# Patient Record
Sex: FEMALE | Race: WHITE | NOT HISPANIC OR LATINO | Employment: FULL TIME | URBAN - METROPOLITAN AREA
[De-identification: names, ages, dates, MRNs, and addresses within clinical notes are randomized per-mention and may not be internally consistent; named-entity substitution may affect disease eponyms.]

---

## 2018-07-11 ENCOUNTER — TRANSCRIBE ORDERS (OUTPATIENT)
Dept: LAB | Facility: HOSPITAL | Age: 48
End: 2018-07-11

## 2018-07-11 ENCOUNTER — LAB (OUTPATIENT)
Dept: LAB | Facility: HOSPITAL | Age: 48
End: 2018-07-11
Attending: ORTHOPAEDIC SURGERY

## 2018-07-11 DIAGNOSIS — M25.562 LEFT KNEE PAIN, UNSPECIFIED CHRONICITY: ICD-10-CM

## 2018-07-11 DIAGNOSIS — M25.562 LEFT KNEE PAIN, UNSPECIFIED CHRONICITY: Primary | ICD-10-CM

## 2018-07-11 LAB
ALBUMIN SERPL-MCNC: 4.5 G/DL (ref 3.5–5.2)
ALBUMIN/GLOB SERPL: 1.7 G/DL
ALP SERPL-CCNC: 36 U/L (ref 39–117)
ALT SERPL W P-5'-P-CCNC: 14 U/L (ref 1–33)
ANION GAP SERPL CALCULATED.3IONS-SCNC: 13.8 MMOL/L
AST SERPL-CCNC: 15 U/L (ref 1–32)
BASOPHILS # BLD AUTO: 0.02 10*3/MM3 (ref 0–0.2)
BASOPHILS NFR BLD AUTO: 0.4 % (ref 0–1.5)
BILIRUB SERPL-MCNC: 0.5 MG/DL (ref 0.1–1.2)
BUN BLD-MCNC: 18 MG/DL (ref 6–20)
BUN/CREAT SERPL: 16.1 (ref 7–25)
CALCIUM SPEC-SCNC: 9.7 MG/DL (ref 8.6–10.5)
CHLORIDE SERPL-SCNC: 101 MMOL/L (ref 98–107)
CO2 SERPL-SCNC: 24.2 MMOL/L (ref 22–29)
CREAT BLD-MCNC: 1.12 MG/DL (ref 0.57–1)
DEPRECATED RDW RBC AUTO: 45.1 FL (ref 37–54)
EOSINOPHIL # BLD AUTO: 0.05 10*3/MM3 (ref 0–0.7)
EOSINOPHIL NFR BLD AUTO: 0.9 % (ref 0.3–6.2)
ERYTHROCYTE [DISTWIDTH] IN BLOOD BY AUTOMATED COUNT: 12.8 % (ref 11.7–13)
GFR SERPL CREATININE-BSD FRML MDRD: 52 ML/MIN/1.73
GLOBULIN UR ELPH-MCNC: 2.6 GM/DL
GLUCOSE BLD-MCNC: 85 MG/DL (ref 65–99)
HCT VFR BLD AUTO: 37.4 % (ref 35.6–45.5)
HGB BLD-MCNC: 12.6 G/DL (ref 11.9–15.5)
IMM GRANULOCYTES # BLD: 0.01 10*3/MM3 (ref 0–0.03)
IMM GRANULOCYTES NFR BLD: 0.2 % (ref 0–0.5)
LYMPHOCYTES # BLD AUTO: 1.96 10*3/MM3 (ref 0.9–4.8)
LYMPHOCYTES NFR BLD AUTO: 35.8 % (ref 19.6–45.3)
MCH RBC QN AUTO: 32.6 PG (ref 26.9–32)
MCHC RBC AUTO-ENTMCNC: 33.7 G/DL (ref 32.4–36.3)
MCV RBC AUTO: 96.9 FL (ref 80.5–98.2)
MONOCYTES # BLD AUTO: 0.57 10*3/MM3 (ref 0.2–1.2)
MONOCYTES NFR BLD AUTO: 10.4 % (ref 5–12)
NEUTROPHILS # BLD AUTO: 2.87 10*3/MM3 (ref 1.9–8.1)
NEUTROPHILS NFR BLD AUTO: 52.5 % (ref 42.7–76)
PLATELET # BLD AUTO: 195 10*3/MM3 (ref 140–500)
PMV BLD AUTO: 11.8 FL (ref 6–12)
POTASSIUM BLD-SCNC: 4.3 MMOL/L (ref 3.5–5.2)
PROT SERPL-MCNC: 7.1 G/DL (ref 6–8.5)
RBC # BLD AUTO: 3.86 10*6/MM3 (ref 3.9–5.2)
SODIUM BLD-SCNC: 139 MMOL/L (ref 136–145)
WBC NRBC COR # BLD: 5.47 10*3/MM3 (ref 4.5–10.7)

## 2018-07-11 PROCEDURE — 80053 COMPREHEN METABOLIC PANEL: CPT

## 2018-07-11 PROCEDURE — 36415 COLL VENOUS BLD VENIPUNCTURE: CPT

## 2018-07-11 PROCEDURE — 85025 COMPLETE CBC W/AUTO DIFF WBC: CPT

## 2018-12-06 ENCOUNTER — APPOINTMENT (OUTPATIENT)
Dept: PREADMISSION TESTING | Facility: HOSPITAL | Age: 48
End: 2018-12-06

## 2018-12-06 VITALS
SYSTOLIC BLOOD PRESSURE: 104 MMHG | HEART RATE: 62 BPM | OXYGEN SATURATION: 99 % | HEIGHT: 65 IN | WEIGHT: 150.7 LBS | BODY MASS INDEX: 25.11 KG/M2 | TEMPERATURE: 97.8 F | DIASTOLIC BLOOD PRESSURE: 71 MMHG | RESPIRATION RATE: 20 BRPM

## 2018-12-06 LAB
ALBUMIN SERPL-MCNC: 4.3 G/DL (ref 3.5–5.2)
ALBUMIN/GLOB SERPL: 1.5 G/DL
ALP SERPL-CCNC: 41 U/L (ref 39–117)
ALT SERPL W P-5'-P-CCNC: 18 U/L (ref 1–33)
ANION GAP SERPL CALCULATED.3IONS-SCNC: 10.6 MMOL/L
AST SERPL-CCNC: 26 U/L (ref 1–32)
BASOPHILS # BLD AUTO: 0.04 10*3/MM3 (ref 0–0.2)
BASOPHILS NFR BLD AUTO: 0.8 % (ref 0–1.5)
BILIRUB SERPL-MCNC: 0.4 MG/DL (ref 0.1–1.2)
BUN BLD-MCNC: 9 MG/DL (ref 6–20)
BUN/CREAT SERPL: 10.8 (ref 7–25)
CALCIUM SPEC-SCNC: 9.5 MG/DL (ref 8.6–10.5)
CHLORIDE SERPL-SCNC: 100 MMOL/L (ref 98–107)
CO2 SERPL-SCNC: 28.4 MMOL/L (ref 22–29)
CREAT BLD-MCNC: 0.83 MG/DL (ref 0.57–1)
DEPRECATED RDW RBC AUTO: 45.9 FL (ref 37–54)
EOSINOPHIL # BLD AUTO: 0.05 10*3/MM3 (ref 0–0.7)
EOSINOPHIL NFR BLD AUTO: 1 % (ref 0.3–6.2)
ERYTHROCYTE [DISTWIDTH] IN BLOOD BY AUTOMATED COUNT: 12.5 % (ref 11.7–13)
GFR SERPL CREATININE-BSD FRML MDRD: 73 ML/MIN/1.73
GLOBULIN UR ELPH-MCNC: 2.9 GM/DL
GLUCOSE BLD-MCNC: 75 MG/DL (ref 65–99)
HCT VFR BLD AUTO: 39.4 % (ref 35.6–45.5)
HGB BLD-MCNC: 12.7 G/DL (ref 11.9–15.5)
IMM GRANULOCYTES # BLD: 0 10*3/MM3 (ref 0–0.03)
IMM GRANULOCYTES NFR BLD: 0 % (ref 0–0.5)
LYMPHOCYTES # BLD AUTO: 1.84 10*3/MM3 (ref 0.9–4.8)
LYMPHOCYTES NFR BLD AUTO: 36.4 % (ref 19.6–45.3)
MCH RBC QN AUTO: 32.3 PG (ref 26.9–32)
MCHC RBC AUTO-ENTMCNC: 32.2 G/DL (ref 32.4–36.3)
MCV RBC AUTO: 100.3 FL (ref 80.5–98.2)
MONOCYTES # BLD AUTO: 0.5 10*3/MM3 (ref 0.2–1.2)
MONOCYTES NFR BLD AUTO: 9.9 % (ref 5–12)
NEUTROPHILS # BLD AUTO: 2.63 10*3/MM3 (ref 1.9–8.1)
NEUTROPHILS NFR BLD AUTO: 51.9 % (ref 42.7–76)
PLATELET # BLD AUTO: 239 10*3/MM3 (ref 140–500)
PMV BLD AUTO: 10.7 FL (ref 6–12)
POTASSIUM BLD-SCNC: 3.9 MMOL/L (ref 3.5–5.2)
PROT SERPL-MCNC: 7.2 G/DL (ref 6–8.5)
RBC # BLD AUTO: 3.93 10*6/MM3 (ref 3.9–5.2)
SODIUM BLD-SCNC: 139 MMOL/L (ref 136–145)
WBC NRBC COR # BLD: 5.06 10*3/MM3 (ref 4.5–10.7)

## 2018-12-06 PROCEDURE — 80053 COMPREHEN METABOLIC PANEL: CPT | Performed by: ORTHOPAEDIC SURGERY

## 2018-12-06 PROCEDURE — 36415 COLL VENOUS BLD VENIPUNCTURE: CPT

## 2018-12-06 PROCEDURE — 85025 COMPLETE CBC W/AUTO DIFF WBC: CPT | Performed by: ORTHOPAEDIC SURGERY

## 2018-12-06 NOTE — DISCHARGE INSTRUCTIONS
Take the following medications the morning of surgery with a small sip of water:  NONE    HOSPITAL TO CALL DAY BEFORE SURGERY WITH ARRIVAL TIME       General Instructions:  • Do not eat solid food after midnight the night before surgery.  • You may drink clear liquids day of surgery but must stop at least one hour before your hospital arrival time   • It is beneficial for you to have a clear drink that contains carbohydrates the day of surgery.  We suggest a 12 to 20 ounce bottle of Gatorade or Powerade for non-diabetic patients or a 12 to 20 ounce bottle of G2 or Powerade Zero for diabetic patients. (Pediatric patients, are not advised to drink a 12 to 20 ounce carbohydrate drink)    Clear liquids are liquids you can see through.  Nothing red in color.     Plain water                               Sports drinks  Sodas                                   Gelatin (Jell-O)  Fruit juices without pulp such as white grape juice and apple juice  Popsicles that contain no fruit or yogurt  Tea or coffee (no cream or milk added)  Gatorade / Powerade  G2 / Powerade Zero    • Infants may have breast milk up to four hours before surgery.  • Infants drinking formula may drink formula up to six hours before surgery.   • Patients who avoid smoking, chewing tobacco and alcohol for 4 weeks prior to surgery have a reduced risk of post-operative complications.  Quit smoking as many days before surgery as you can.  • Do not smoke, use chewing tobacco or drink alcohol the day of surgery.   • If applicable bring your C-PAP/ BI-PAP machine.  • Bring any papers given to you in the doctor’s office.  • Wear clean comfortable clothes and socks.  • Do not wear contact lenses or make-up.  Bring a case for your glasses.   • Bring crutches or walker if applicable.  • Remove all piercings.  Leave jewelry and any other valuables at home.  • Hair extensions with metal clips must be removed prior to surgery.  • The Pre-Admission Testing nurse will  instruct you to bring medications if unable to obtain an accurate list in Pre-Admission Testing.            Preventing a Surgical Site Infection:  • For 2 to 3 days before surgery, avoid shaving with a razor because the razor can irritate skin and make it easier to develop an infection.    • Any areas of open skin can increase the risk of a post-operative wound infection by allowing bacteria to enter and travel throughout the body.  Notify your surgeon if you have any skin wounds / rashes even if it is not near the expected surgical site.  The area will need assessed to determine if surgery should be delayed until it is healed.  • The night prior to surgery sleep in a clean bed with clean clothing.  Do not allow pets to sleep with you.  • Shower on the morning of surgery using a fresh bar of anti-bacterial soap (such as Dial) and clean washcloth.  Dry with a clean towel and dress in clean clothing.  • Ask your surgeon if you will be receiving antibiotics prior to surgery.  • Make sure you, your family, and all healthcare providers clean their hands with soap and water or an alcohol based hand  before caring for you or your wound.    Day of surgery:  Upon arrival, a Pre-op nurse and Anesthesiologist will review your health history, obtain vital signs, and answer questions you may have.  The only belongings needed at this time will be your home medications and if applicable your C-PAP/BI-PAP machine.  If you are staying overnight your family can leave the rest of your belongings in the car and bring them to your room later.  A Pre-op nurse will start an IV and you may receive medication in preparation for surgery, including something to help you relax.  Your family will be able to see you in the Pre-op area.  While you are in surgery your family should notify the waiting room  if they leave the waiting room area and provide a contact phone number.    Please be aware that surgery does come with  discomfort.  We want to make every effort to control your discomfort so please discuss any uncontrolled symptoms with your nurse.   Your doctor will most likely have prescribed pain medications.      If you are going home after surgery you will receive individualized written care instructions before being discharged.  A responsible adult must drive you to and from the hospital on the day of your surgery and stay with you for 24 hours.    If you are staying overnight following surgery, you will be transported to your hospital room following the recovery period.  [facility] has all private rooms.    You have received a list of surgical assistants for your reference.  If you have any questions please call Pre-Admission Testing at [phone].  Deductibles and co-payments are collected on the day of service. Please be prepared to pay the required co-pay, deductible or deposit on the day of service as defined by your plan.

## 2018-12-18 RX ORDER — CEFAZOLIN SODIUM 2 G/100ML
2 INJECTION, SOLUTION INTRAVENOUS ONCE
Status: COMPLETED | OUTPATIENT
Start: 2018-12-20 | End: 2018-12-20

## 2018-12-18 NOTE — H&P
PA/NP/PoA: KIARRA RAMOS PA-C  Supervising Physician: WESLEY STACY MD  HPI  Candace is a pleasant 48-year-old  female who is here today with a 2 week history of left knee pain.  She is a teacher at [email or web address].  She relates her initial history back to 6/10/2018 when she was running and a dog ran out in front of her.  She had a hyperextension injury to the left knee.  Then on 6/24/18 she was chasing her horse and stumbled injuring the left knee further.  She felt a pop in the left knee and has had increased pain ever since.  She describes the current symptoms as soreness, aching, swelling, weakness.  She states her pain at rest is a 3 out of 10 and her pain with activity is an 8 out of 10.  She has tried ibuprofen and ice which has only offered her mild pain relief.  She's here today for further evaluation and treatment.  She denies any previous injury or pain to the knee. MRI was ordered.   Review of Systems:  Patient denies: Abdominal Pain, Bleeding, Chest Pain, Convulsions/Seizure, Decreased Motion, Depression, Difficulty Swallowing, Easy Bruisability, Emotional Disturbances, Eyes or Vision Problems, Fecal Incontinence, Fever/Chills, Headaches, Increased Thirst, Increased Hunger, Insomnia, Joint Pain, Nausea/Vomiting, Night Sweats, Poor Balance, Persistent Cough, Rash, Shortness of Breath, Shortness of Breath While Lying down, Skin Problems, Urinary Retention and Weakness.  Allergies:  * no known allergies  Medications:  * no current medications   Patient History of:  BLOOD CLOTS/EMBOLISM - NEGATIVE  MENOPAUSE  Surgical History:  BREAST REDUCTION-   RT ELBOW-   Known Family History of:  cancer-mother  Past medical, social, family histories and ROS reviewed today with the patient and changes documented in the chart (07/02/2018).  Referring Dr. MILLICENT GUY MD  PCP Dr. ABDOULAYE MACE, FATMATA RINCON    Physical Exam  Height:  66 in.    Weight:  151 lbs.     BMI:  24.46      Gait: antalgic                      Mental/HEENT/Cardio/Skin  The patient's general appearance is well developed, well nourished, no acute distress.  Orientation is alert and oriented x 3.  The patient's mood is normal.  A head exam reveals normocephalic/atraumatic.  An eye exam reveals pupils equal.  Pulmonary exam shows normal air exchange, no labored breathing, or shortness of breath.    Right Knee      Left Knee  Skin is normal.  Normal alignment.  There is no atrophy.  Effusion is positive.  No venous status deformatitus.  No warmth.  No erythema.  Normal sensation.  Capillary refill is normal.  Reflexes are normal.  Range of motion of the knee is 5 to <110 degrees of flexion.  There is tenderness in the medial joint knee.  Patellar tracking is normal.  Patellar crepitation is not present.  Crepitus is not present.  The leg lengths are equal.  Pes planus is negative.  PTTI is negative.  Lachman grade 0 with a soft endpoint.  Posterior drawer grade 0.  Posterior lateral drawer grade 0.  Pivot shift grade 0.  Valgus grade 0.  Varus grade 0.  Imani's is positive.        Imaging/Diagnostic Studies  MRI shows complete tear ACL with signal in the medial meniscus.  Bone contusion pattern consistent with ACL tear.      Impression  Left anterior cruciate ligament acute tear  Left acute medial meniscus tear    Plan  We discussed the benefits of surgical intervention, as well as alternative treatments.  Potential surgical risks and complications include but are not limited to bleeding, infection, failure of repaired structures to heal biologically, stiffness, recurrence, nerve or vascular injury, residual pain, and the possibility of revision surgery and prolonged convalescence.  Sufficient opportunity was given to discuss the condition and treatment plan with the doctor, and all questions were answered for the patient.      We have reviewed MRI results in detail and both operative and nonoperative options.  She is very active and athletic  and would like to pursue operative intervention which will be a left knee ACL reconstruction with patellar tendon allograft and partial medial meniscectomy.

## 2018-12-20 ENCOUNTER — ANESTHESIA EVENT (OUTPATIENT)
Dept: PERIOP | Facility: HOSPITAL | Age: 48
End: 2018-12-20

## 2018-12-20 ENCOUNTER — HOSPITAL ENCOUNTER (OUTPATIENT)
Facility: HOSPITAL | Age: 48
Setting detail: HOSPITAL OUTPATIENT SURGERY
Discharge: HOME | End: 2018-12-20
Attending: ORTHOPAEDIC SURGERY | Admitting: ORTHOPAEDIC SURGERY

## 2018-12-20 ENCOUNTER — ANESTHESIA (OUTPATIENT)
Dept: PERIOP | Facility: HOSPITAL | Age: 48
End: 2018-12-20

## 2018-12-20 VITALS
SYSTOLIC BLOOD PRESSURE: 110 MMHG | OXYGEN SATURATION: 97 % | HEART RATE: 63 BPM | RESPIRATION RATE: 20 BRPM | TEMPERATURE: 98.4 F | BODY MASS INDEX: 24.95 KG/M2 | DIASTOLIC BLOOD PRESSURE: 76 MMHG | WEIGHT: 149.91 LBS

## 2018-12-20 DIAGNOSIS — Z98.890 S/P ACL RECONSTRUCTION: Primary | ICD-10-CM

## 2018-12-20 LAB
B-HCG UR QL: NEGATIVE
INTERNAL NEGATIVE CONTROL: NEGATIVE
INTERNAL POSITIVE CONTROL: POSITIVE
Lab: NORMAL

## 2018-12-20 PROCEDURE — 25010000002 ONDANSETRON PER 1 MG: Performed by: NURSE ANESTHETIST, CERTIFIED REGISTERED

## 2018-12-20 PROCEDURE — 25010000002 DEXAMETHASONE PER 1 MG: Performed by: NURSE ANESTHETIST, CERTIFIED REGISTERED

## 2018-12-20 PROCEDURE — 63710000001 PROMETHAZINE PER 25 MG: Performed by: ANESTHESIOLOGY

## 2018-12-20 PROCEDURE — 25010000002 FENTANYL CITRATE (PF) 100 MCG/2ML SOLUTION: Performed by: NURSE ANESTHETIST, CERTIFIED REGISTERED

## 2018-12-20 PROCEDURE — 25010000002 FENTANYL CITRATE (PF) 100 MCG/2ML SOLUTION: Performed by: ANESTHESIOLOGY

## 2018-12-20 PROCEDURE — 81025 URINE PREGNANCY TEST: CPT | Performed by: ANESTHESIOLOGY

## 2018-12-20 PROCEDURE — 25010000002 MIDAZOLAM PER 1 MG: Performed by: ANESTHESIOLOGY

## 2018-12-20 PROCEDURE — C1713 ANCHOR/SCREW BN/BN,TIS/BN: HCPCS | Performed by: ORTHOPAEDIC SURGERY

## 2018-12-20 PROCEDURE — 25010000002 PROPOFOL 10 MG/ML EMULSION: Performed by: NURSE ANESTHETIST, CERTIFIED REGISTERED

## 2018-12-20 PROCEDURE — 25010000003 CEFAZOLIN IN DEXTROSE 2-4 GM/100ML-% SOLUTION: Performed by: PHYSICIAN ASSISTANT

## 2018-12-20 PROCEDURE — 25010000002 DEXAMETHASONE PER 1 MG: Performed by: ANESTHESIOLOGY

## 2018-12-20 DEVICE — SCRW CANN INTERFER FULLTHRD 7X20MM: Type: IMPLANTABLE DEVICE | Site: KNEE | Status: FUNCTIONAL

## 2018-12-20 DEVICE — SCRW CANN INTERF SHEATH 7X20MM: Type: IMPLANTABLE DEVICE | Site: KNEE | Status: FUNCTIONAL

## 2018-12-20 DEVICE — IMPLANTABLE DEVICE: Type: IMPLANTABLE DEVICE | Site: KNEE | Status: FUNCTIONAL

## 2018-12-20 RX ORDER — PROMETHAZINE HYDROCHLORIDE 25 MG/1
25 TABLET ORAL ONCE AS NEEDED
Status: DISCONTINUED | OUTPATIENT
Start: 2018-12-20 | End: 2018-12-20

## 2018-12-20 RX ORDER — NALOXONE HCL 0.4 MG/ML
0.2 VIAL (ML) INJECTION AS NEEDED
Status: DISCONTINUED | OUTPATIENT
Start: 2018-12-20 | End: 2018-12-20 | Stop reason: HOSPADM

## 2018-12-20 RX ORDER — OXYCODONE AND ACETAMINOPHEN 7.5; 325 MG/1; MG/1
1 TABLET ORAL ONCE AS NEEDED
Status: DISCONTINUED | OUTPATIENT
Start: 2018-12-20 | End: 2018-12-20 | Stop reason: HOSPADM

## 2018-12-20 RX ORDER — SODIUM CHLORIDE, SODIUM LACTATE, POTASSIUM CHLORIDE, AND CALCIUM CHLORIDE .6; .31; .03; .02 G/100ML; G/100ML; G/100ML; G/100ML
IRRIGANT IRRIGATION AS NEEDED
Status: DISCONTINUED | OUTPATIENT
Start: 2018-12-20 | End: 2018-12-20 | Stop reason: HOSPADM

## 2018-12-20 RX ORDER — FENTANYL CITRATE 50 UG/ML
INJECTION, SOLUTION INTRAMUSCULAR; INTRAVENOUS AS NEEDED
Status: DISCONTINUED | OUTPATIENT
Start: 2018-12-20 | End: 2018-12-20 | Stop reason: SURG

## 2018-12-20 RX ORDER — MIDAZOLAM HYDROCHLORIDE 1 MG/ML
1 INJECTION INTRAMUSCULAR; INTRAVENOUS
Status: DISCONTINUED | OUTPATIENT
Start: 2018-12-20 | End: 2018-12-20 | Stop reason: HOSPADM

## 2018-12-20 RX ORDER — DIPHENHYDRAMINE HYDROCHLORIDE 50 MG/ML
12.5 INJECTION INTRAMUSCULAR; INTRAVENOUS
Status: DISCONTINUED | OUTPATIENT
Start: 2018-12-20 | End: 2018-12-20 | Stop reason: HOSPADM

## 2018-12-20 RX ORDER — FENTANYL CITRATE 50 UG/ML
50 INJECTION, SOLUTION INTRAMUSCULAR; INTRAVENOUS
Status: DISCONTINUED | OUTPATIENT
Start: 2018-12-20 | End: 2018-12-20 | Stop reason: HOSPADM

## 2018-12-20 RX ORDER — FLUMAZENIL 0.1 MG/ML
0.2 INJECTION INTRAVENOUS AS NEEDED
Status: DISCONTINUED | OUTPATIENT
Start: 2018-12-20 | End: 2018-12-20 | Stop reason: HOSPADM

## 2018-12-20 RX ORDER — LIDOCAINE HYDROCHLORIDE AND EPINEPHRINE BITARTRATE 20; .01 MG/ML; MG/ML
INJECTION, SOLUTION SUBCUTANEOUS
Status: COMPLETED | OUTPATIENT
Start: 2018-12-20 | End: 2018-12-20

## 2018-12-20 RX ORDER — LIDOCAINE HYDROCHLORIDE 10 MG/ML
0.5 INJECTION, SOLUTION EPIDURAL; INFILTRATION; INTRACAUDAL; PERINEURAL ONCE AS NEEDED
Status: COMPLETED | OUTPATIENT
Start: 2018-12-20 | End: 2018-12-20

## 2018-12-20 RX ORDER — LABETALOL HYDROCHLORIDE 5 MG/ML
5 INJECTION, SOLUTION INTRAVENOUS
Status: DISCONTINUED | OUTPATIENT
Start: 2018-12-20 | End: 2018-12-20 | Stop reason: HOSPADM

## 2018-12-20 RX ORDER — OXYCODONE AND ACETAMINOPHEN 7.5; 325 MG/1; MG/1
1 TABLET ORAL EVERY 4 HOURS PRN
Qty: 40 TABLET | Refills: 0 | OUTPATIENT
Start: 2018-12-20 | End: 2022-02-09

## 2018-12-20 RX ORDER — PROMETHAZINE HYDROCHLORIDE 25 MG/ML
12.5 INJECTION, SOLUTION INTRAMUSCULAR; INTRAVENOUS ONCE AS NEEDED
Status: COMPLETED | OUTPATIENT
Start: 2018-12-20 | End: 2018-12-20

## 2018-12-20 RX ORDER — PROMETHAZINE HYDROCHLORIDE 25 MG/1
25 SUPPOSITORY RECTAL ONCE AS NEEDED
Status: DISCONTINUED | OUTPATIENT
Start: 2018-12-20 | End: 2018-12-20

## 2018-12-20 RX ORDER — ONDANSETRON 4 MG/1
4 TABLET, FILM COATED ORAL DAILY PRN
Qty: 30 TABLET | Refills: 1 | Status: SHIPPED | OUTPATIENT
Start: 2018-12-20 | End: 2019-12-20

## 2018-12-20 RX ORDER — SODIUM CHLORIDE 0.9 % (FLUSH) 0.9 %
1-10 SYRINGE (ML) INJECTION AS NEEDED
Status: DISCONTINUED | OUTPATIENT
Start: 2018-12-20 | End: 2018-12-20 | Stop reason: HOSPADM

## 2018-12-20 RX ORDER — ACETAMINOPHEN 650 MG/1
650 SUPPOSITORY RECTAL ONCE AS NEEDED
Status: DISCONTINUED | OUTPATIENT
Start: 2018-12-20 | End: 2018-12-20 | Stop reason: HOSPADM

## 2018-12-20 RX ORDER — SODIUM CHLORIDE, SODIUM LACTATE, POTASSIUM CHLORIDE, CALCIUM CHLORIDE 600; 310; 30; 20 MG/100ML; MG/100ML; MG/100ML; MG/100ML
9 INJECTION, SOLUTION INTRAVENOUS CONTINUOUS
Status: DISCONTINUED | OUTPATIENT
Start: 2018-12-20 | End: 2018-12-20 | Stop reason: HOSPADM

## 2018-12-20 RX ORDER — PROMETHAZINE HYDROCHLORIDE 25 MG/1
12.5 TABLET ORAL ONCE AS NEEDED
Status: COMPLETED | OUTPATIENT
Start: 2018-12-20 | End: 2018-12-20

## 2018-12-20 RX ORDER — BUPIVACAINE HYDROCHLORIDE AND EPINEPHRINE 5; 5 MG/ML; UG/ML
INJECTION, SOLUTION EPIDURAL; INTRACAUDAL; PERINEURAL
Status: COMPLETED | OUTPATIENT
Start: 2018-12-20 | End: 2018-12-20

## 2018-12-20 RX ORDER — EPHEDRINE SULFATE 50 MG/ML
5 INJECTION, SOLUTION INTRAVENOUS ONCE AS NEEDED
Status: DISCONTINUED | OUTPATIENT
Start: 2018-12-20 | End: 2018-12-20 | Stop reason: HOSPADM

## 2018-12-20 RX ORDER — PROMETHAZINE HYDROCHLORIDE 25 MG/ML
12.5 INJECTION, SOLUTION INTRAMUSCULAR; INTRAVENOUS ONCE AS NEEDED
Status: DISCONTINUED | OUTPATIENT
Start: 2018-12-20 | End: 2018-12-20

## 2018-12-20 RX ORDER — MIDAZOLAM HYDROCHLORIDE 1 MG/ML
2 INJECTION INTRAMUSCULAR; INTRAVENOUS
Status: DISCONTINUED | OUTPATIENT
Start: 2018-12-20 | End: 2018-12-20 | Stop reason: HOSPADM

## 2018-12-20 RX ORDER — PROPOFOL 10 MG/ML
VIAL (ML) INTRAVENOUS AS NEEDED
Status: DISCONTINUED | OUTPATIENT
Start: 2018-12-20 | End: 2018-12-20 | Stop reason: SURG

## 2018-12-20 RX ORDER — HYDRALAZINE HYDROCHLORIDE 20 MG/ML
5 INJECTION INTRAMUSCULAR; INTRAVENOUS
Status: DISCONTINUED | OUTPATIENT
Start: 2018-12-20 | End: 2018-12-20 | Stop reason: HOSPADM

## 2018-12-20 RX ORDER — HYDROCODONE BITARTRATE AND ACETAMINOPHEN 7.5; 325 MG/1; MG/1
1 TABLET ORAL ONCE AS NEEDED
Status: DISCONTINUED | OUTPATIENT
Start: 2018-12-20 | End: 2018-12-20 | Stop reason: HOSPADM

## 2018-12-20 RX ORDER — PROMETHAZINE HYDROCHLORIDE 25 MG/ML
12.5 INJECTION, SOLUTION INTRAMUSCULAR; INTRAVENOUS EVERY 6 HOURS PRN
Status: DISCONTINUED | OUTPATIENT
Start: 2018-12-20 | End: 2018-12-20 | Stop reason: HOSPADM

## 2018-12-20 RX ORDER — DEXTROSE, SODIUM CHLORIDE, SODIUM LACTATE, POTASSIUM CHLORIDE, AND CALCIUM CHLORIDE 5; .6; .31; .03; .02 G/100ML; G/100ML; G/100ML; G/100ML; G/100ML
125 INJECTION, SOLUTION INTRAVENOUS CONTINUOUS
Status: DISCONTINUED | OUTPATIENT
Start: 2018-12-20 | End: 2018-12-20 | Stop reason: HOSPADM

## 2018-12-20 RX ORDER — HYDROMORPHONE HYDROCHLORIDE 1 MG/ML
0.5 INJECTION, SOLUTION INTRAMUSCULAR; INTRAVENOUS; SUBCUTANEOUS
Status: DISCONTINUED | OUTPATIENT
Start: 2018-12-20 | End: 2018-12-20 | Stop reason: HOSPADM

## 2018-12-20 RX ORDER — DEXAMETHASONE SODIUM PHOSPHATE 4 MG/ML
INJECTION, SOLUTION INTRA-ARTICULAR; INTRALESIONAL; INTRAMUSCULAR; INTRAVENOUS; SOFT TISSUE
Status: COMPLETED | OUTPATIENT
Start: 2018-12-20 | End: 2018-12-20

## 2018-12-20 RX ORDER — ONDANSETRON 2 MG/ML
4 INJECTION INTRAMUSCULAR; INTRAVENOUS ONCE AS NEEDED
Status: COMPLETED | OUTPATIENT
Start: 2018-12-20 | End: 2018-12-20

## 2018-12-20 RX ORDER — LIDOCAINE HYDROCHLORIDE 20 MG/ML
INJECTION, SOLUTION INFILTRATION; PERINEURAL AS NEEDED
Status: DISCONTINUED | OUTPATIENT
Start: 2018-12-20 | End: 2018-12-20 | Stop reason: SURG

## 2018-12-20 RX ORDER — DIPHENHYDRAMINE HCL 25 MG
25 CAPSULE ORAL
Status: DISCONTINUED | OUTPATIENT
Start: 2018-12-20 | End: 2018-12-20 | Stop reason: HOSPADM

## 2018-12-20 RX ORDER — FAMOTIDINE 10 MG/ML
20 INJECTION, SOLUTION INTRAVENOUS ONCE
Status: COMPLETED | OUTPATIENT
Start: 2018-12-20 | End: 2018-12-20

## 2018-12-20 RX ORDER — PROMETHAZINE HYDROCHLORIDE 25 MG/1
25 SUPPOSITORY RECTAL ONCE AS NEEDED
Status: COMPLETED | OUTPATIENT
Start: 2018-12-20 | End: 2018-12-20

## 2018-12-20 RX ORDER — ACETAMINOPHEN 325 MG/1
650 TABLET ORAL ONCE AS NEEDED
Status: DISCONTINUED | OUTPATIENT
Start: 2018-12-20 | End: 2018-12-20 | Stop reason: HOSPADM

## 2018-12-20 RX ORDER — DEXAMETHASONE SODIUM PHOSPHATE 10 MG/ML
INJECTION INTRAMUSCULAR; INTRAVENOUS AS NEEDED
Status: DISCONTINUED | OUTPATIENT
Start: 2018-12-20 | End: 2018-12-20 | Stop reason: SURG

## 2018-12-20 RX ADMIN — DEXAMETHASONE SODIUM PHOSPHATE 4 MG: 4 INJECTION, SOLUTION INTRAMUSCULAR; INTRAVENOUS at 12:55

## 2018-12-20 RX ADMIN — FENTANYL CITRATE 100 MCG: 50 INJECTION INTRAMUSCULAR; INTRAVENOUS at 14:17

## 2018-12-20 RX ADMIN — PROPOFOL 200 MG: 10 INJECTION, EMULSION INTRAVENOUS at 14:03

## 2018-12-20 RX ADMIN — SODIUM CHLORIDE, POTASSIUM CHLORIDE, SODIUM LACTATE AND CALCIUM CHLORIDE 9 ML/HR: 600; 310; 30; 20 INJECTION, SOLUTION INTRAVENOUS at 12:20

## 2018-12-20 RX ADMIN — MIDAZOLAM HYDROCHLORIDE 2 MG: 2 INJECTION, SOLUTION INTRAMUSCULAR; INTRAVENOUS at 12:58

## 2018-12-20 RX ADMIN — FAMOTIDINE 20 MG: 10 INJECTION, SOLUTION INTRAVENOUS at 12:58

## 2018-12-20 RX ADMIN — FENTANYL CITRATE 50 MCG: 50 INJECTION, SOLUTION INTRAMUSCULAR; INTRAVENOUS at 13:03

## 2018-12-20 RX ADMIN — LIDOCAINE HYDROCHLORIDE AND EPINEPHRINE 5 ML: 20; 10 INJECTION, SOLUTION INFILTRATION; PERINEURAL at 12:55

## 2018-12-20 RX ADMIN — SODIUM CHLORIDE, POTASSIUM CHLORIDE, SODIUM LACTATE AND CALCIUM CHLORIDE 9 ML/HR: 600; 310; 30; 20 INJECTION, SOLUTION INTRAVENOUS at 16:10

## 2018-12-20 RX ADMIN — CEFAZOLIN SODIUM 2 G: 2 INJECTION, SOLUTION INTRAVENOUS at 14:05

## 2018-12-20 RX ADMIN — LIDOCAINE HYDROCHLORIDE 0.5 ML: 10 INJECTION, SOLUTION EPIDURAL; INFILTRATION; INTRACAUDAL; PERINEURAL at 12:20

## 2018-12-20 RX ADMIN — DEXAMETHASONE SODIUM PHOSPHATE 8 MG: 10 INJECTION INTRAMUSCULAR; INTRAVENOUS at 14:17

## 2018-12-20 RX ADMIN — LIDOCAINE HYDROCHLORIDE 100 MG: 20 INJECTION, SOLUTION INFILTRATION; PERINEURAL at 14:03

## 2018-12-20 RX ADMIN — FENTANYL CITRATE 100 MCG: 50 INJECTION INTRAMUSCULAR; INTRAVENOUS at 14:03

## 2018-12-20 RX ADMIN — BUPIVACAINE HYDROCHLORIDE AND EPINEPHRINE BITARTRATE 15 ML: 5; .0091 INJECTION, SOLUTION EPIDURAL; INTRACAUDAL; PERINEURAL at 12:55

## 2018-12-20 RX ADMIN — PROMETHAZINE HYDROCHLORIDE 12.5 MG: 25 TABLET ORAL at 16:34

## 2018-12-20 RX ADMIN — SODIUM CHLORIDE, SODIUM LACTATE, POTASSIUM CHLORIDE, CALCIUM CHLORIDE AND DEXTROSE MONOHYDRATE 125 ML/HR: 5; 600; 310; 30; 20 INJECTION, SOLUTION INTRAVENOUS at 16:33

## 2018-12-20 RX ADMIN — ONDANSETRON 4 MG: 2 INJECTION INTRAMUSCULAR; INTRAVENOUS at 16:13

## 2018-12-20 NOTE — ANESTHESIA POSTPROCEDURE EVALUATION
Patient: Candace Greer    Procedure Summary     Date:  12/20/18 Room / Location:   [facility]  /  [facility]    Anesthesia Start:  1401 Anesthesia Stop:      Procedure:  LEFT KNEE ARTHROSCOPY WITH  ANTERIOR CRUCIATE LIGAMENT RECONSTRUCTION WITH PATELLA TENDON ALLOGRAFT, PARTIAL MEDIAL MENISECTOMY (Left Knee) Diagnosis:      Surgeon:  Jose Ramos MD Provider:  Benji Sánchez MD    Anesthesia Type:  general with block ASA Status:  1          Anesthesia Type: general with block  Last vitals  BP   103/56 (12/20/18 1320)   Temp   36.8 °C (98.3 °F) (12/20/18 1206)   Pulse   72 (12/20/18 1320)   Resp   16 (12/20/18 1320)     SpO2   98 % (12/20/18 1320)     Anesthesia Post Evaluation

## 2018-12-20 NOTE — ANESTHESIA PREPROCEDURE EVALUATION
Anesthesia Evaluation     Patient summary reviewed and Nursing notes reviewed   NPO Solid Status: > 8 hours  NPO Liquid Status: > 2 hours           Airway   Mallampati: II  no difficulty expected  Dental - normal exam     Pulmonary     breath sounds clear to auscultation  Cardiovascular     ECG reviewed  Rhythm: regular  Rate: normal        Neuro/Psych  GI/Hepatic/Renal/Endo      Musculoskeletal     Abdominal    Substance History      OB/GYN          Other                        Anesthesia Plan    ASA 1     general with block     intravenous induction   Anesthetic plan, all risks, benefits, and alternatives have been provided, discussed and informed consent has been obtained with: patient.

## 2018-12-20 NOTE — ANESTHESIA PROCEDURE NOTES
Peripheral Block    Pre-sedation assessment completed: 12/20/2018 12:55 PM    Patient reassessed immediately prior to procedure    Patient location during procedure: holding area  Start time: 12/20/2018 12:56 PM  Stop time: 12/20/2018 1:13 PM  Reason for block: at surgeon's request and post-op pain management  Performed by  Anesthesiologist: Benji Sánchez MD  Preanesthetic Checklist  Completed: patient identified, site marked, surgical consent, pre-op evaluation, timeout performed, IV checked, risks and benefits discussed and monitors and equipment checked  Prep:  Sterile barriers:cap, gloves and mask  Prep: ChloraPrep  Patient monitoring: blood pressure monitoring, continuous pulse oximetry and EKG  Procedure  Sedation:yes    Guidance:ultrasound guided and nerve stimulator  ULTRASOUND INTERPRETATION. Using ultrasound guidance a 20 G gauge needle was placed in close proximity to the nerve, at which point, under ultrasound guidance anesthetic was injected in the area of the nerve and spread of the anesthesia was seen on ultrasound in close proximity thereto.  There were no abnormalities seen on ultrasound; a digital image was taken; and the patient tolerated the procedure with no complications. Images:still images obtained    Laterality:left  Block Type:femoral  Injection Technique:single-shot  Needle Type:echogenic  Needle Gauge:20 G  Resistance on Injection: none  Cath Depth at skin: 8 cm  Medications Used: bupivacaine-EPINEPHrine PF (MARCAINE w/EPI) 0.5% -1:755500 injection, 15 mL  lidocaine-EPINEPHrine (XYLOCAINE W/EPI) 2 %-1:195984 injection, 5 mL  dexamethasone (DECADRON) injection, 4 mg  Post Assessment  Injection Assessment: negative aspiration for heme  Patient Tolerance:comfortable throughout block  Complications:yes

## 2018-12-20 NOTE — DISCHARGE INSTRUCTIONS
Dr. Ramos  [address] [address]  [phone]    Discharge Instructions for ACL/PCL RECONSTRUCTION     SPECIFIC POST-OP INSTRUCTIONS:  * FOLLOW UP APPOINTMENT: You will need to call our office [phone] and make an appointment to follow up 7-10 days after your date of surgery. We can see you back sooner if there are any problems or concerns.   * SUTURES: Keep sutures clean and dry. Suture removal is usually done during 1st post-op appointment.   * ICE: Ice helps to decrease both pain and swelling. Ice should be applied to both the front and back of the knee 20-25 minutes each hour while awake.   * DRESSING CHANGES: Leave dressing intact for 24-48 hours. You can then remove tape, white gauze pads and small yellow gauze strips. Do not remove steri-strips (white tape strips). We ask that you keep the incision clean and dry. Please use water proof Band-Aids to cover incision(s) while showering. These can be purchased at your local pharmacy.  These can be changed daily or as needed. Do not use any ointment on the incision(s).   * SHOWERING: The wound edges typically come together and seal by 3-5 days post-op if there is no drainage. Again, please use waterproof Band-Aids to cover incision(s) while showering. DO NOT SUBMERSE KNEE IN POOL, HOT TUB OR BATH UNTIL AT LEAST 3-4 WEEKS POST-OP (EVEN WITH WATERPROOF BANDAIDS).  * PAIN MEDICINE: You will be given a prescription for oral pain medication prior to discharge from the hospital. Please let us know if you have a sensitivity to certain pain medications prior to discharge. Additional pain medication prescriptions can be written, but must be picked up at either our [place] or [place] office locations. They can NOT be called into your pharmacy.   * ORAL ANTI-INFLAMMATORIES:   You will be asked to discontinue ALL oral anti-inflammatories 2 weeks prior to surgery. You can resume these day of surgery - AFTER YOUR PROCEDURE/ONCE YOU ARE HOME.  These can be  "combined with the oral pain medications safely. DO NOT TAKE ADDITIONAL TYLENOL WITH THE NARCOTIC PAIN MEDICATION (it already has Tylenol in it). If you were not taking an anti-inflammatory pre-op, you can start one of them the first day post-op. It will help decrease pain and swelling. Common medications and dosages are as follows:   Advil/Motrin/Ibuprofen 200mg, 4 pills every 8 hours   Aleve/Naproxen Sodium 220mg, 2 pills every 12 hours  * CRUTCHES/BRACE: You can be weight bearing as tolerated with crutches. Typically people use crutches for 2-3 weeks after ACL or PCL reconstruction. If you awoke from surgery in a brace, please wear it at all times until your first post-op visit (except for showering).   * PHYSICAL THERAPY: Either prior to hospital discharge or during your first post-op visit, we will give you a prescription to start physical therapy. This can be done downstairs at LOC  or at a location of your choice. Physical therapy is typically 2-3 times a week for 6-12 weeks, depending on the procedure, the individual, and his/her progress.   * DRIVING A CAR: Medically/legally we cannot recommend you drive a car while using crutches/brace or while on pain medication.   * RETURNING TO DAILY AND RECREATIONAL ACTIVITIES: For the most part, patients can progress to daily activities as tolerated (keeping in mind the restrictions listed above). Initially, we do not want you to \"overdo\" it in an attempt to minimize post-op pain and swelling. Once the swelling is controlled, you can progress with activities as tolerated. Pain and swelling should be your guide to increasing your activity level.   * RETURN TO WORK: The return to work date depends on many factors and is very dependent on the individual. You would most likely be able to return to a sedentary job after your first post-op visit (7-10 days after surgery). We can discuss specific work restrictions based on your job duties during this visit. A more physical " job would obviously require a longer recovery time before return to work.         What to expect after a Nerve Block    Nerve blocks administered to block pain affect many types of nerves, including those nerves that control movement, pain, and normal sensation. Following a nerve block, you may notice some bruising at the site where the block was given. You may experience sensations such as: numbness of the affected area or limb, tingling, heaviness (that is the limb feels heavy to you), weakness or inability to move the affected arm or leg, or a feeling as if your arm or leg has “fallen asleep.”     A nerve block can last from 2 to 36 hours depending on the medications used.  Usually the weakness wears off first followed by the tingling and heaviness. As the block wears off, you may begin to notice pain; however, this sequence of events may occur in any order. Typically, you will be able to move your limb before you will feel it. Once a nerve block begins to wear off, the effects are usually completely gone within 60 minutes.  If you experience continued side effects that you believe are block related for longer than 48 hours, please call your healthcare provider. Please see block-specific instructions below.    Instructions for any Block involving the leg/foot:   If you have had a leg /foot block, you should not bear weight on the affected leg until the block has worn off. After the block has worn off, weight bearing should be as directed by your surgeon. You may be sent home with crutches. You are at high risk for falling because of the anesthetic effects on your leg. Please use caution when standing or trying to move or walk. Have someone assist you until your leg and foot function have returned to normal.     Protection of a “blocked” limb  • After a nerve block, you cannot feel pain, pressure, or extremes of temperature in the affected limb. And because of this, your blocked limb is at more risk for injury.  For example, it is possible to burn your limb on an extremely hot surface without feeling it.     • When resting, it is important to reposition your limb periodically to avoid prolonged pressure on it. This may require the use of pillows and padding.    • While sleeping, you should avoid rolling onto the affected limb or putting too much pressure on it.     • If you have a cast or tight dressing, check the color of your fingers or toes of the affected limb. Call your surgeon if they look discolored (that is, dusky, dark colored).    • Use caution in cold weather. Cover your limb appropriately to protect it from the cold.    Pain Management:  Your surgeon will give you a prescription for pain medication. Begin taking this before the nerve block wears off. Bear in mind that sometimes the block can wear off in the middle of the night.

## 2018-12-20 NOTE — ANESTHESIA PROCEDURE NOTES
ANESTHESIA INTUBATION  Urgency: elective      General Information and Staff    Patient location during procedure: OR  Anesthesiologist: Benji Sánchez MD  CRNA: Katelynn Tello CRNA    Indications and Patient Condition    Preoxygenated: yes  Mask difficulty assessment: 0 - not attempted    Final Airway Details  Final airway type: supraglottic airway      Successful airway: unique  Size 4    Number of attempts at approach: 1    Additional Comments  Atraumatic, adeq seal, secured, MV/AV until SV

## 2018-12-20 NOTE — OP NOTE
.KNEE ANTERIOR CRUCIATE LIGAMENT RECONSTRUCTION  Procedure Note    Candace Greer  12/20/2018    Pre-op Diagnosis:   Chronic ACL tear left knee  Medial meniscus tear left knee    Post-op diagnosis:  Post-Op Diagnosis Codes:  Chronic ACL tear left knee  Medial meniscus tear left knee    Procedure(s):  1.  LEFT KNEE ARTHROSCOPY WITH  ANTERIOR CRUCIATE LIGAMENT RECONSTRUCTION WITH PATELLA TENDON ALLOGRAFT  2.  LEFT KNEE ARTHROSCOPY WITH PARTIAL MEDIAL MENISECTOMY    Surgeon(s):  Jose Ramos MD    Anesthesia: General  Anesthesiologist: Benji Sánchez MD; Edilson Nagel MD  CRNA: Katelynn Tello CRNA    Staff:   Circulator: Kat Dominguez RN; Aden Cr RN  Scrub Person: Rosenda Link  Assistant: Malka Harper    Estimated Blood Loss: minimal    Specimens: * No orders in the log *      Findings: See Dictation    Complications: None    Procedure: The patient was taken to the operative suite.  After adequate anesthesia was established the left lower extremity was prepped and draped in the usual fashion.  The left leg was elevated, exsanguinated with an Esmarch, and tourniquet inflated to 250 mmHg.  An inferior lateral portal was made and the arthroscope was introduced into the knee.  Under direct visualization an inferior medial portal was made and diagnostic arthroscopy commenced with the following findings.  Intact patellofemoral joint was noted.  Gutters were free of loose bodies.  The lateral compartment had some minor fraying of the lateral meniscus interbody but otherwise lateral compartment was normal.  Medial compartment had a posterior horn medial meniscus tear.  The articular cartilage was intact on the medial side of the knee.  The posterior cruciate ligament was intact but the anterior cruciate ligament had a complete tear off the femoral insertion and the posterior notch region.  The area was photographed.   A straight basket punch and full radius resector were used to  perform a partial meniscectomy until a smooth contoured rim was achieved. The areas were photographed.  The anterior cruciate ligament that was torn was debrided with a full-radius resector.  A notchplasty was performed with a shaver and bur.  Over-the-top position was identified in the posterior notch region.  An inferior medial incision was made and a small subperiosteal dissection was carried out.  An ACL guide was placed and a 55° angle and a pin was drilled to the ACL insertion site.  A cannulated 10 mm reamer was used to drill a tunnel and then the area was debrided.  I used an over-the-top guide through the inferior medial portal and then drilled a guidepin at the ACL insertion site 2:00 on the posterior notch and the guide pin exited the thigh.  A low profile 10 mm ream was used to ream a tunnel to a depth of 25 mm.  I then used a suture loop that was passed into the femoral tunnel and then grasped and pulled out the tibial tunnelwas used to pull a lower tendon allograft up through the drill tunnel and into the femoral tunnel.  The allograft at the previously marked drilled and contoured to pass through 10 mm tunnels smoothly.  I fixed the femoral side with a 7 x 20 mm Arthrex interference screw titanium.  I checked isometry and made sure there was no impingement and fixed the tibial side with a 7 x 20 mm titanium Arthrex interference screw with good purchase and fixation.  The knee was able to be fully ranged and had good stability.  I vigorously irrigated and suctioned the entire knee.  The portals were closed with 4-0 nylon.  The incision was closed with 0 Vicryl and 3-0 Vicryl and then Steri-Strips for the skin.  Sterile compression dressing and brace were both applied tourniquet was released..  She was awoken and taken to the postanesthetic recovery unit in good condition.        Jose Ramos MD     Date: 12/20/2018  Time: 3:32 PM

## 2018-12-31 ENCOUNTER — HOSPITAL ENCOUNTER (OUTPATIENT)
Dept: PHYSICAL THERAPY | Facility: HOSPITAL | Age: 48
Setting detail: THERAPIES SERIES
End: 2018-12-31
Attending: ORTHOPAEDIC SURGERY

## (undated) DEVICE — 5.5 MM ACROMIONIZER STRAIGHT                                    BURRS, POWER/EP-1, BROWN, 8000                                    MAXIMUM RPM, PACKAGED 6 PER BOX, STERILE

## (undated) DEVICE — SUT ETHLN 4/0 PS2 PLSTC 1667G

## (undated) DEVICE — GLV SURG BIOGEL LTX PF 7

## (undated) DEVICE — 5.5 MM FULL RADIUS STRAIGHT                                    BLADES, POWER/EP-1, ORANGE, PACKAGED                                    6 PER BOX, STERILE

## (undated) DEVICE — PK ACL 40

## (undated) DEVICE — DISPOSABLE TOURNIQUET CUFF SINGLE BLADDER, SINGLE PORT AND QUICK CONNECT CONNECTOR: Brand: COLOR CUFF

## (undated) DEVICE — GLV SURG BIOGEL LTX PF 8

## (undated) DEVICE — INTENT TO BE USED WITH SUTURE MATERIAL FOR TISSUE CLOSURE: Brand: RICHARD-ALLAN® NEEDLE STRAIGHT CUTTING

## (undated) DEVICE — PK ARTHSCP 40

## (undated) DEVICE — TBG ARTHSCP PT W CONN/REDUC 8FT

## (undated) DEVICE — BNDG ESMARK STRL 6INX12FT LF

## (undated) DEVICE — SUT PROLN 2/0 FSLX 30IN 8689H

## (undated) DEVICE — DRSNG GZ PETROLTM XEROFORM CURAD 1X8IN STRL

## (undated) DEVICE — UNDERCAST PADDING: Brand: DEROYAL

## (undated) DEVICE — SKIN PREP TRAY W/CHG: Brand: MEDLINE INDUSTRIES, INC.

## (undated) DEVICE — SUT VIC 0 CT1 36IN J946H

## (undated) DEVICE — SUT VIC 2/0 FS1 27IN J443H

## (undated) DEVICE — REAMR LP 10MM  STRL

## (undated) DEVICE — PAD,ABDOMINAL,8"X10",ST,LF: Brand: MEDLINE

## (undated) DEVICE — SUT FW 2/0 38IN 1BLU 1BLK/WHT  AR7201
Type: IMPLANTABLE DEVICE | Site: KNEE | Status: NON-FUNCTIONAL
Removed: 2018-12-20

## (undated) DEVICE — GLV SURG BIOGEL LTX PF 6 1/2

## (undated) DEVICE — SUT PROLN 4/0 FS2 18IN 8683G

## (undated) DEVICE — KT ACL TRANSTIB WO/ SAWBLD

## (undated) DEVICE — GLV SURG BIOGEL LTX PF 8 1/2

## (undated) DEVICE — PRECISION THIN (9.0 X 0.38 X 25.0MM)

## (undated) DEVICE — SUT FIBERLINK BR PB NO2 W/CLSDLP 1.5IN
Type: IMPLANTABLE DEVICE | Site: KNEE | Status: NON-FUNCTIONAL
Removed: 2018-12-20

## (undated) DEVICE — 4.5 MM FULL RADIUS STRAIGHT                                    BLADES, POWER/EP-1, YELLOW, PACKAGED                                    6 PER BOX, STERILE: Brand: DYONICS